# Patient Record
(demographics unavailable — no encounter records)

---

## 2017-08-09 NOTE — PCVCIMAG
--------------- APPROVED REPORT --------------





Exam: Nuclear Stress Test

Indication: Atrial Fibrillation, Dyspnea

Patient Location: Out-Patient

Stress Nurse: Jolanta Velazquez RN, Nahomy Holden RN

NM Tech:Mariel Sarmientoalvarez [facility]



Ht: 5 ft 3 in Wt: 188 lbs BSA:  1.88 m2

HR: 71 bpm                      BP: 194/90 mmHg         BMI:  

33.2

Rhythm:  NSR



Medical History

Medical History: HTN, Hyperlipidemia, COPD, CAD, PVD (AAA), 

Smoking

Medications: Aspirin, Lisinopril-HCTZ, Atorvastatin, Inhalers, 

Potassium

Allergies: BACTRUM, PCN

Previous Cardiac Procedures: PCI

Pretest Chest Pain Characteristics: No chest pain

Physical Disabilities: Legs

Meds Held (24 hrs): Pt held all meds per her choice.



Stress Test Details

Stress Test:  Pharmacologic stress testing performed using 0.4 mg of 

regadenoson per 5 mL given IV over 10 seconds.

  Reason for pharmacologic stress test: physical 

limitation.

HR

Resting HR:            71 bpmMax Heart Rate (APMHR): 148 bpm 

Max HR Achieved:  94 bpmTarget HR (85% APMHR): 125 bpm

% of APMHR:         63

Recovery HR:            92 bpm



BP

Resting BP:  194/90 mmHg

Max BP:       187/103 mmHg



ECG

Resting ECG:  Sinus Rhythm

Stress ECG:     Sinus Rhythm

ST Change: Non-ischemic

Recovery ECG: Sinus Rhythm



Clinical

Reason for Termination: Completed protocol

Stress Symptoms: Abdominal discomfort, Dyspnea, Back 

Discomfort

Symptoms resolved with caffeine.



NM EXAM: Myocardial Perfusion REST/STRESS

Imaging Protocol: Rest Tc-99m/Stress Tc-99m 1 day



Resting Data

Rest SPECT myocardial perfusion imaging was performed in supine 

position 45 minutes following the intravenous injection of 15.1 mCi 

of Tc-99m Sestamibi.

Time of rest injection: 0930     Date: 08/09/2017

Administration Route: IV

Administration Site: Right Arm



Pharmacologic Stress

Pharmacologic stress test was performed by injecting Regadenoson 0.4 

mg IV push followed by the intravenous injection of 44.9 mCi of 

Tc-99m Sestamibi.

Time of stress injection: 1100     Date: 08/09/2017

Administration Route: IV

Administration Site: Right Arm

Gated Stress SPECT was performed 45 minutes after stress 

injection.

The images were gated to evaluate regional wall motion and calculate 

left ventricular ejection fraction. 



Study Quality

Study: Good



Study Data

Post stress, the left ventricular ejection was 52%..

SSS: 0

SRS: 7

SDS: 0

TID = 1.00.



Perfusion

Normal perfusion on both the stress and rest images.



Wall Motion

Normal left ventricular wall motion.



Nuclear Conclusion

ECG Findings: negative for ischemia

Clinical Findings: non-diagnostic

Nuclear Findings: negative for ischemia

This study is of low probability for inducible ischemia or prior 

infarct.

Normal global and segmental LV systolic function.

## 2018-10-24 NOTE — PCVCIMAG
EXAM: BILATERAL CAROTID DUPLEX



INDICATION: Carotid Occlusive Disease.



FINDINGS: 

Doppler Measurements (centimeters per second):



RIGHT:  Peak CCA-70, Peak ECA-47, Diastolic ICA-22, Peak ICA-60,

ICA/CCA Ratio-0.8.



LEFT:  Peak CCA-70, Peak ECA-45, Diastolic ICA-27, Peak ICA-88,

ICA/CCA Ratio-0.9.



RIGHT CAROTID: The carotid bulb has mild plaque. The proximal internal

carotid artery shows <40% stenosis. The common carotid artery shows no

significant stenosis. The external carotid artery shows no significant

stenosis.



LEFT CAROTID:  The carotid bulb has moderate plaque. The proximal

internal carotid artery shows <40% stenosis. The common carotid artery

shows no significant stenosis. The external carotid artery shows no

significant stenosis.



Antegrade flow in both vertebral arteries.



IMPRESSION: 

<40% stenosis of the right internal carotid artery with mild plaque.

<40% stenosis of the left internal carotid artery with moderate

plaque.



LOC:[place] [id number]

## 2018-10-24 NOTE — PCVCIMAG
EXAM: AORTOILIAC DUPLEX



INDICATION: Abdominal aortic aneurysm. 



FINDINGS: 



AORTA: Suprarenal aorta measures maximum diameter of 3.4 cm. There is

a fusiform infrarenal aortic aneurysm. The infrarenal aorta measures

maximum diameter of 5.2 cm. No aortic stenosis.



RIGHT COMMON ILIAC ARTERY: Maximum diameter is 1.9 cm. No significant

stenosis.



RIGHT EXTERNAL ILIAC ARTERY:  70-80% stenosis proximal vessel.

Previous stent distal vessel is patent.



LEFT COMMON ILIAC ARTERY: Maximum diameter is 1.8 cm.  No significant

stenosis.



LEFT EXTERNAL ILIAC ARTERY:  No significant stenosis.



IMPRESSION: 

5.2 cm infrarenal abdominal aortic aneurysm. Please see CTA

examination from June 2018 showing abdominal aneurysm measuring 5.4

cm.

70-80% stenosis proximal right external iliac artery.





LOC:[id number]

## 2019-01-07 NOTE — EKG
[address]  [address]
Phone:  [phone]                    ELECTROCARDIOGRAM REPORT      
_______________________________________________________________________________
 
Name:       ISIS CHUNG    Room #:                     PRE IN  
M.R.#:      [id number]     Account #:      [id number]  
Admission:              Attend Phys:    Shahab Vasquez MD    
Discharge:              Date of Birth:  10/25/44  
                                                          Report #: [id number]
   [id number]
_______________________________________________________________________________
THIS REPORT FOR:   //name//                          
 
                          [facility]
                                       
Test Date:    2019               Test Time:    10:08:30
Pat Name:     ISIS KRAMER Department:   
Patient ID:   [id number]            Room:          
Gender:       F                        Technician:   FERNANDO JONES
:          1944               Requested By: Shahab Vasquez
Order Number: [id number] MD:   Go Bennett
                                 Measurements
Intervals                              Axis          
Rate:         77                       P:            40
UT:           178                      QRS:          3
QRSD:         95                       T:            5
QT:           410                                    
QTc:          465                                    
                           Interpretive Statements
Sinus rhythm
Borderline T abnormalities, anterior leads
No previous ECG available for comparison
 
Electronically Signed On 2019 13:22:14 CST by Go Bennett
[email or web address]
 
 
 
 
 
 
 
 
 
 
 
 
 
 
 
 
 
 
 
  <ELECTRONICALLY SIGNED>
   By: Go Bennett MD        
  19     1322
D: 19 1008                           _____________________________________
T: 19 1008                           Go Bennett MD          /WILLIAM

## 2019-01-14 NOTE — NUR
PT ADMITTED TO ICU FROM REC ROOM POST AAA STENT.  AWAKE AND ALERT NEURO
INTACT. RIGHT RADIAL ART LINE./ EPIDURAL CATH IN PLACE  BUT NOT TO DRAIN.
BILAT GROIN DRESSINGS DRY AND INTACT. CARDENE GTT TITRATE TO KEEP ART BP <160
WILL CONT TO MONITOR

## 2019-01-15 NOTE — NUR
PT UP IN CHAIR, WELL TOLERATED. HAD EMESIS OF UNDIGESTED FOOD, ZOFRAN IV GIVEN
FOR NAUSEA WITH RELIEF. REFUSED DINNER. PULLING 1,250 ON INCENTIVE SPIROMETER.
AFTER GETTING UP TO CHAIR, SAO2 94-95%, PLACED ON ROOM AIR.  EPIDURAL SITE
UNCHANGED, CIRCULATION, SENSAATION AND MOVEMENT PRESENT IN BI LOWER
EXTREMITIES. BI GROIN SITES DRY/INTACT. SON AND SPOUSE PRESENT, PROVIDING
SUPPORT TO PT.

## 2019-01-15 NOTE — NUR
EPIDURAL REMOVED WITH STERILE TECHNIQUE BY ANESTHESIOLOGIST. PT C/O OF BACK
DISCOMFORT FROM TAPE REMOVAL AND SINCE PT NEEDS TO LAY FLAT, FENTANYL 25 MCG
IV GIVEN. O2 2L/NC SINCE SA02 91%. PULSES, SENSATION AND MOTION PRESENT IN BI
LOWER EXTREMITIES. DAUGHTER PRESENT,  CALLED TO INQUIRE ON PT STATUS
AND BOTH UPDATED.

## 2019-01-15 NOTE — NUR
PT RESTED QUIETLY TONIGHT. PAIN MED GIVEN FOR LOWER BACK PAIN. CARDENE GTT AT
5 MG. LUNGS CLEAR.  BATHED. PULSES INTACT. BILAT GROIN DRESSINGS INTACT.
SINUS RHYTHM. WILL CONT TO MONITOR.

## 2019-01-15 NOTE — NUR
DR. MAHONEY PRESENT TO ROUND ON PT THIS AM. . CARDENE TITRATED OFF, RADIAL
DIONICIO DC'D, DOWN TO 1L/NC, LUNGS CLEAR, SCANT AMOUNT OF BREAKFAST CONSUMED,
ADEQUATE URINE OUTPUT PER SOUZA. EPIDURAL INTACT, SENSATION AND MOTION PRESENT
IN BI LOWER EXTREMITIES. PT PROGRESSING.

## 2019-01-15 NOTE — O
[facility]
[address]
[place]   [address]                     OPERATIVE REPORT              
_______________________________________________________________________________
 
Name:       ISIS CHUNG    Room #:         246-P       ADM IN  
M.R.#:      [id number]                       Account #:      [id number]  
Admission:  01/14/19    Attend Phys:    Shahab Vasquez MD    
Discharge:              Date of Birth:  10/25/44  
                                                          Report #: [id number]
                                                                    [id number]   
_______________________________________________________________________________
THIS REPORT FOR:   //name//                          
 
CC: Shahab Camacho
 
DATE OF SERVICE:  01/14/2019
 
 
PREOPERATIVE DIAGNOSIS:  Infrarenal abdominal aortic aneurysm.
 
POSTOPERATIVE DIAGNOSIS:  Infrarenal abdominal aortic aneurysm.
 
OPERATION:  Placement of endovascular stent graft for abdominal aortic aneurysm
with intraoperative arteriograms.
 
SURGEON:  Shahab Vasquez MD., and Reyes Maxwell MD.
 
ANESTHESIA:  General.
 
INDICATIONS:  The patient is a 74-year-old with a large infrarenal abdominal
aortic aneurysm.  This measures 5 cm or more.  The patient has a history of a
previous thoracic stent graft placed approximately 2 years ago.
 
FINDINGS AND TECHNIQUE:  Because of the previous stent graft placement, we had
asked anesthesia to place a lumbar drain for CSF drainage.
 
Exposure was obtained through groin incisions.  Because the patient had previous
surgery, this was a reoperative exposure of the right and left femoral artery.
 
10,000 units of heparin were given on each side.  An Amplatz needle was used to
access the femoral artery and a guidewire was passed and the 5-Kiswahili sheaths
were placed.
 
On the left side, the guidewire was placed into the aorta and a catheter was
used for exchange, so we could place a Lex wire.  Over the Lex wire, the
femoral artery was controlled and a femoral arteriotomy was made, so that we
could pass the 18-Kiswahili delivery sheath.
 
On the right side, a similar technique was used.  Ultimately, a Lex wire was
placed and over it the 16-Kiswahili sheath was placed for the contralateral limb
access.
 
On the left side, the main body was passed.  This was a 35 x 14.5 x 14 main
component.  Through the right side, a visceral catheter was used to gain access
to the right renal artery and this allowed us to perform a selective renal
arteriogram and identified the takeoff, so that we could precisely place the
 
 
 
[facility]
[address]
[place]   [address]                     OPERATIVE REPORT              
_______________________________________________________________________________
 
Name:       ISIS CHUNG    Room #:         246-P       [facility] [place]  
.R.#:      [id number]                       Account #:      [id number]  
Admission:  01/14/19    Attend Phys:    Shahab Vasquez MD    
Discharge:              Date of Birth:  10/25/44  
                                                          Report #: [id number]
                                                                    [id number]   
_______________________________________________________________________________
proximal end of the main component.
 
The main component was opened and the contralateral limb was cannulated through
the right side.  Good position within the contralateral limb was ascertained
with the pigtail catheter using the spin technique.
 
The pigtail catheter was brought down into the component through the gate of the
contralateral limb and a retrograde aortogram was done through the sheath to
identify the hypogastric takeoff.  Using this information, a 27 x 12 right iliac
limb was selected and introduced through the 16-Kiswahili sheath on the right and
deployed in the contralateral limb at the appropriate spot.  We were happy with
the position both proximally and distally and therefore, a balloon was used to
set this in position as well as to set the proximal landing zone.
 
Through the left side, a similar procedure was performed using a retrograde
flush through the introducing catheter to identify the hypogastric takeoff and a
20 x 9.5 extender limb was selected and this was placed in position.
 
When all of the components had been placed, the balloon was used to fully expand
proximally, distally and at the graft overlap sites.
 
Final arteriogram was taken to illustrate the stent, but this showed a small
proximal type 1 endoleak.  With this information, a 36 x 4.5 cuff was placed
through the main component side, the left side, and then the balloon was
deployed again to fully expand both the device, the main component and the new
cuff.  Once again, an arteriogram was taken to illustrate the entire graft and
we were happy with both its position vis-a-vis the visceral vessels and with the
fact that there were no type 1 or type 2 leaks.
 
Satisfied with these positions, the dilators were placed back into the sheaths,
the sheaths were removed and then the guidewires were removed.
 
The arteriotomies were closed primarily with interrupted 6-0 Prolene.  Flow was
reestablished.  Heparin, which had been given at the outset of the case, was
reversed and hemostasis was ascertained.  The wounds were closed in layers and
the patient was taken to the recovery area in good condition with warm feet.  In
the recovery area, the patient was noted to have good neurologic function
distally.  All counts reported as correct.
 
 
 
 
 
 
  <ELECTRONICALLY SIGNED>
   By: Shahab Vasquez MD            
  01/15/19     1649
D: 01/15/19 1522                           _____________________________________
T: 01/15/19 1547                           Shahab Vasquez MD              /nt

## 2019-01-16 NOTE — NUR
END OF SHIFT NOTE: THIS RN ASSUMED CARE OF PT AROUND 1900. ASSESSMENTS PER
DOCUMENTATION. PT C/O A LOT OF PAIN DESPITE MEDICATIONS. VSS, NO DISTRESS
NOTED. IVF INFUSING WITHOUT DIFFICULTY. PT HAS BEEN ON ROOM AIR/1-2L NC FOR
DESATTING WHILE ASLEEP 87-90% BUT ASYMPTOMATIC. ADEQUATE UOP AT 450ML FOR THIS
SHIFT. BILATERAL GROIN INCISIONS WNL, CLEAN/DRY/INTACT, SKIN WARM, NO SWELLING
NOTED. PULSES PALPABLE. PT DENIES SOA/TINGLING/NUMBNESS. PT DOES HAVE TRANSFER
ORDERS FOR 2N.

## 2019-01-16 NOTE — NUR
Assumed care of patient at 0700.  Patient awake, resting comfortably.  Reports
some nausea last night but not today.  Rec'd one dose of IV pain meds in the
morning, but pain is tolerable the rest of the day on PO.  Seen by cardiac NP
and CV surg MD.  Lares out, patient able to void.  Weaned off oxygen.  Up to
chair for meals and ambulated in halls.  Ok'd for discharge.  Education done
bedside with son and spouse present.  Prescription for oral hydrocodone given.
Reviewed instructions for follow up with Dr. Vasquez and CTA with Dr. Sullivan.
Discharge by private vehicle.  PIV and tele removed.

## 2019-09-18 NOTE — PCVCIMAG
--------------- APPROVED REPORT --------------





Imaging Protocol: Rest Tc-99m/Stress Tc-99m 1 day

Study performed:  09/18/2019 10:12:36



Indication: CAD , Chest pain

Patient Location: Out-Patient

Stress Nurse: Jolanta Velazquez RN, Nahomy Holden RN

NM Tech:Mariel SarmientoAUDREY pinoMT



Ht: 5 ft 3 in Wt: 186 lbs BSA:  1.88 m2

HR: 70 bpm                      BP: 143/64 mmHg         BMI:  

32.94

Rhythm:  Sinus Rhythm



Medical History

Medical History: Current Smoker

Medications: ASA, Atorvastatin, Plavix, Lisinopril-HCTZ

Allergies: Bactrim, Keflex, PCN

Cardiac Risk Factors: Age

Previous Cardiac Procedures: 2014 PCI - RCA

Pretest Chest Pain Characteristics: No chest pain

Exercise History: Sedentary

Physical Disabilities: Legs



Resting Data

Rest SPECT myocardial perfusion imaging was performed in supine 

position 45 minutes following the intravenous injection of 9 mCi of 

Tc-99m Sestamibi.

Time of rest injection: 0930     Date: 09/18/2019

Administration Route: IV

Administration Site: Right Arm



Pharmacologic Stress

Pharmacologic stress test was performed by injecting Regadenoson 0.4 

mg IV push over 10-15 seconds immediately followed by the intravenous 

injection of 34 mCi of Tc-99m Sestamibi.

Time of stress injection: 1040     Date: 09/18/2019

Administration Route: IV

Administration Site: Right Arm

Gated Stress SPECT was performed 45 minutes after stress 

injection.

The images were gated to evaluate regional wall motion and calculate 

left ventricular ejection fraction. 



Stress Test Details

Stress Test:  Pharmacologic stress testing performed using 0.4 mg of 

regadenoson per 5 mL given IV over 10 seconds.

  Reason for pharmacologic stress test: physical limitation, knee 

issues.



HRMax Heart Rate (APMHR): 146 bpm 

Resting HR:            70 bpmTarget HR (85% APMHR): 124 bpm

Max HR Achieved:  96 bpm

% of APMHR:         65

Recovery HR:            86 bpm



BP

Resting BP:  143/64 mmHg

Max BP:       141/75 mmHg

Recovery BP:       141/75 mmHg

ECG

Resting ECG:  Sinus Rhythm

Stress ECG:     Sinus Rhythm

ST Change: Non-ischemic

Arrhythmia:    None

Recovery ECG: Sinus Rhythm



Clinical

Reason for Termination: Completed protocol

Stress Symptoms: Abdominal discomfort

Symptoms resolved with caffeine.



Study Quality

Study: Good

Artifact: Mild Breast artifact



Study Data

Post stress, the left ventricular ejection was 63%..

SSS: 4

SRS: 4

SDS: 0

TID = 0.95.



Perfusion

There is a small area of mildly reduced uptake in the apical segment 

of the anterior wall which is seen on the stress images as well as 

the resting images. This area thickens and moves normally and is most 

consistent with attenuation artifact.



Wall Motion

Normal left ventricular wall motion.



Nuclear Conclusion

ECG Findings: negative for ischemia 

Clinical Findings: non-diagnostic 

Nuclear Findings: negative for ischemia 

Exercise Capacity: not assessed

Left Ventricular Function: normal 

This study is of low probability for inducible ischemia or prior 

infarct.

Normal global and segmental LV systolic function.

Artifact:  Mild Breast artifact